# Patient Record
Sex: FEMALE | Race: WHITE | NOT HISPANIC OR LATINO | Employment: PART TIME | URBAN - METROPOLITAN AREA
[De-identification: names, ages, dates, MRNs, and addresses within clinical notes are randomized per-mention and may not be internally consistent; named-entity substitution may affect disease eponyms.]

---

## 2017-11-05 ENCOUNTER — APPOINTMENT (OUTPATIENT)
Dept: RADIOLOGY | Facility: CLINIC | Age: 27
End: 2017-11-05

## 2017-11-05 ENCOUNTER — OFFICE VISIT (OUTPATIENT)
Dept: URGENT CARE | Facility: CLINIC | Age: 27
End: 2017-11-05

## 2017-11-05 DIAGNOSIS — S99.921A INJURY OF RIGHT FOOT: ICD-10-CM

## 2017-11-05 PROCEDURE — 99204 OFFICE O/P NEW MOD 45 MIN: CPT

## 2017-11-05 PROCEDURE — 73630 X-RAY EXAM OF FOOT: CPT

## 2017-11-06 NOTE — PROGRESS NOTES
Assessment  1  Contusion of right foot, initial encounter (379 20) (S90 31XA)    Plan  Injury of right foot, initial encounter    · * XR FOOT 3+ VIEW RIGHT; Status:Resulted - Requires Verification,Retrospective By  Protocol Authorization;   Done: [id number] 11:57AM    Discussion/Summary  Discussion Summary:   X-ray review showed no fracture  Patient was instructed to take 200 milligram ibuprofen 3 times a day and soak in warm water  She will take Colace is out of her shoes as this aggravated her pain that she has been dealing with for the last 3 days  Her mother was present with her and understood the recommendations  Counseling Documentation With Imm: The patient, patient's family was counseled regarding diagnostic results,-- instructions for management  Follow Up Instructions: Follow Up with your Primary Care Provider in 2-3 days  If your symptoms worsen, go to the nearest [facility] Emergency Department  Chief Complaint  1  Foot Problem  Chief Complaint Free Text Note Form: dropped a tv on right foot 3-4 days ago still having pain and foot is ecchymotic at present time Pedal pulse noted to foot  History of Present Illness  HPI: Back is very pleasant 24-year-old female who was present with her mother states she had draft than 0 fashion television on the dorsum of her right foot 3 4 days ago  She wishes an x-ray due to the fact that the ecchymosis and swelling have gone down however ecchymosis has traveled into the skin  She has been taking ibuprofen 200 milligrams occasionally without relief  She has been using shoes that have Adonna Melita is rather tight and this has been giving her problem  Hospital Based Practices Required Assessment:   Pain Assessment   the patient states they have pain  The pain is located in the Dorsum of right foot   The patient describes the pain as dull and aching  (on a scale of 0 to 10, the patient rates the pain at 10 )   Abuse And Domestic Violence Screen    Yes, the patient is safe at home  -- The patient states no one is hurting them  Depression And Suicide Screen  No, the patient has not had thoughts of hurting themself  No, the patient has not felt depressed in the past 7 days  Prefered Language is  [place]  Primary Language is  English  Readiness To Learn: Receptive  Barriers To Learning: none  Education Completed: disease/condition   Teaching Method: verbal   Person Taught: patient   Evaluation Of Learning: verbalized/demonstrated understanding      Review of Systems  Focused-Female:   ENT: no ear ache, no loss of hearing, no nosebleeds or nasal discharge, no sore throat or hoarseness  Cardiovascular: no complaints of slow or fast heart rate, no chest pain, no palpitations, no leg claudication or lower extremity edema  Respiratory: no complaints of shortness of breath, no wheezing, no dyspnea on exertion, no orthopnea or PND  Gastrointestinal: no complaints of abdominal pain, no constipation, no nausea or diarrhea, no vomiting, no bloody stools  Musculoskeletal: as noted in HPI  Integumentary: as noted in HPI  ROS Reviewed:   ROS reviewed  Active Problems  1  Injury of right foot, initial encounter (749 8) (I37 396J)    Past Medical History  Active Problems And Past Medical History Reviewed: The active problems and past medical history were reviewed and updated today  Family History  Family History Reviewed: The family history was reviewed and updated today  Social History   · Never smoker   · No illicit drug use   · Social alcohol use (Z78 9)    Surgical History  Surgical History Reviewed: The surgical history was reviewed and updated today  Current Meds   1  No Reported Medications Recorded    Allergies  1   Penicillins    Vitals  Signs   Recorded: [id number] 11:50AM   Temperature: 97 8 F  Heart Rate: 82  Respiration: 18  Systolic: 525, LUE, Sitting  Diastolic: 66, LUE, Sitting  BP Cuff Size: Large  Height: 6 ft 1 in  Weight: 274 lb 7 54 oz  BMI Calculated: 36 21  BSA Calculated: 2 46  O2 Saturation: 100  Pain Scale: 1    Physical Exam    Constitutional   General appearance: No acute distress, well appearing and well nourished  Eyes   Conjunctiva and lids: No swelling, erythema or discharge  Pupils and irises: Equal, round and reactive to light  Ears, Nose, Mouth, and Throat   External inspection of ears and nose: Normal     Otoscopic examination: Tympanic membranes translucent with normal light reflex  Canals patent without erythema  Nasal mucosa, septum, and turbinates: Normal without edema or erythema  Pulmonary   Auscultation of lungs: Clear to auscultation  Cardiovascular   Auscultation of heart: Normal rate and rhythm, normal S1 and S2, without murmurs  Skin   Skin and subcutaneous tissue: Abnormal  -- ecchymosis noted at the dorsum of the foot with tenderness to palpation and ecchymosis which has traveled to the right great toe and to some of the plantar aspect  Range of motion and neurovascular intact at this time  Psychiatric   Orientation to person, place, and time: Normal     Mood and affect: Normal        Results/Data  * XR FOOT 3+ VIEW RIGHT [id number] 11:57AM [facility] Order Number: [id number]   Performing Comments: room 2     Test Name Result Flag Reference   XR FOOT 3+ VW RIGHT (Report)     RIGHT FOOT     INDICATION: Right foot pain  Injury  COMPARISON: None     VIEWS: 3     IMAGES: 3     FINDINGS:     There is no acute fracture or dislocation  No degenerative changes  No lytic or blastic lesions are seen  Soft tissues are unremarkable  IMPRESSION:     No acute osseous abnormality         Workstation performed: [id number]     Signed by:   Markus Vizcaino MD   11/5/17                               Signatures   Electronically signed by : Josemanuel Bal DO; Nov 5 2017  6:13PM EST                       (Author)

## 2017-12-05 ENCOUNTER — OFFICE VISIT (OUTPATIENT)
Dept: URGENT CARE | Facility: CLINIC | Age: 27
End: 2017-12-05
Payer: COMMERCIAL

## 2017-12-05 PROCEDURE — 86580 TB INTRADERMAL TEST: CPT

## 2017-12-07 NOTE — PROGRESS NOTES
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  Denies any medical issues  Review of Systems  Focused-Female:  Constitutional: No fever, no chills, feels well, no tiredness, no recent weight gain or loss  ENT: no ear ache, no loss of hearing, no nosebleeds or nasal discharge, no sore throat or hoarseness  Cardiovascular: no complaints of slow or fast heart rate, no chest pain, no palpitations, no leg claudication or lower extremity edema  Respiratory: no complaints of shortness of breath, no wheezing, no dyspnea on exertion, no orthopnea or PND  Breasts: no complaints of breast pain, breast lump or nipple discharge  Gastrointestinal: no complaints of abdominal pain, no constipation, no nausea or diarrhea, no vomiting, no bloody stools  Genitourinary: no complaints of dysuria, no incontinence, no pelvic pain, no dysmenorrhea, no vaginal discharge or abnormal vaginal bleeding  Musculoskeletal: no complaints of arthralgia, no myalgia, no joint swelling or stiffness, no limb pain or swelling  Integumentary: no complaints of skin rash or lesion, no itching or dry skin, no skin wounds  Neurological: no complaints of headache, no confusion, no numbness or tingling, no dizziness or fainting  ROS Reviewed:   ROS reviewed  Active Problems  1  Contusion of right foot, initial encounter (054 20) (S90 31XA)   2   Injury of right foot, initial encounter (959 7) (O43 827Y)    Past Medical History  Active Problems And Past Medical History Reviewed: The active problems and past medical history were reviewed and updated today  Family History  Family History Reviewed: The family history was reviewed and updated today  Social History   · Never smoker   · No illicit drug use   · Social alcohol use (Z78 9)  Social History Reviewed: The social history was reviewed and updated today  The social history was reviewed and is unchanged  Surgical History  Surgical History Reviewed: The surgical history was reviewed and updated today  Current Meds   1  No Reported Medications Recorded  Medication List Reviewed: The medication list was reviewed and updated today  Allergies    1  Penicillins    Vitals  Signs   Recorded: [id number] 04:01PM   Temperature: 98 1 F  Heart Rate: 84  Respiration: 18  Blood Pressure: 126 mm Hg  Blood Pressure: 67 mm Hg  Height: 6 ft 1 in  Weight: 279 lb   BMI Calculated: 36 81 kg/m2  BSA Calculated: 2 48 m2  O2 Saturation: 100    Physical Exam   Constitutional  General appearance: No acute distress, well appearing and well nourished  Eyes  Conjunctiva and lids: No swelling, erythema or discharge  Pupils and irises: Equal, round and reactive to light  Ears, Nose, Mouth, and Throat  External inspection of ears and nose: Normal    Otoscopic examination: Tympanic membranes translucent with normal light reflex  Canals patent without erythema  Nasal mucosa, septum, and turbinates: Normal without edema or erythema  Oropharynx: Normal with no erythema, edema, exudate or lesions  Pulmonary  Respiratory effort: No increased work of breathing or signs of respiratory distress  Auscultation of lungs: Clear to auscultation  Cardiovascular  Palpation of heart: Normal PMI, no thrills  Auscultation of heart: Normal rate and rhythm, normal S1 and S2, without murmurs     Examination of extremities for edema and/or varicosities: Normal    Abdomen  Abdomen: Non-tender, no masses  Liver and spleen: No hepatomegaly or splenomegaly  Lymphatic  Palpation of lymph nodes in neck: No lymphadenopathy  Musculoskeletal  Gait and station: Normal    Digits and nails: Normal without clubbing or cyanosis  Inspection/palpation of joints, bones, and muscles: Normal    Skin  Skin and subcutaneous tissue: Normal without rashes or lesions  Neurologic  Cranial nerves: Cranial nerves 2-12 intact  Reflexes: 2+ and symmetric  Sensation: No sensory loss     Psychiatric  Orientation to person, place, and time: Normal    Mood and affect: Normal        Signatures   Electronically signed by : Pretty Dang; Dec  5 2017  4:08PM EST                       (Author)    Electronically signed by : VALDEZ Saldivar ; Dec  6 2017  3:26PM EST                       (Co-author)

## 2018-01-18 NOTE — RESULT NOTES
Verified Results  * XR FOOT 3+ VIEW RIGHT [id number] 11:57AM Erick Winchester Order Number: [id number]   Performing Comments: room 2     Test Name Result Flag Reference   XR FOOT 3+ VW RIGHT (Report)     RIGHT FOOT     INDICATION: Right foot pain  Injury  COMPARISON: None     VIEWS: 3     IMAGES: 3     FINDINGS:     There is no acute fracture or dislocation  No degenerative changes  No lytic or blastic lesions are seen  Soft tissues are unremarkable  IMPRESSION:     No acute osseous abnormality         Workstation performed: [id number]     Signed by:   Sagar Churchill MD   11/5/17       Plan  Injury of right foot, initial encounter    · * XR FOOT 3+ VIEW RIGHT; Status:Complete;   Done: [id number] 11:57AM

## 2018-01-23 VITALS
DIASTOLIC BLOOD PRESSURE: 67 MMHG | HEIGHT: 72 IN | OXYGEN SATURATION: 100 % | SYSTOLIC BLOOD PRESSURE: 126 MMHG | RESPIRATION RATE: 18 BRPM | HEART RATE: 84 BPM | WEIGHT: 279 LBS | BODY MASS INDEX: 37.79 KG/M2 | TEMPERATURE: 98.1 F

## 2021-11-24 ENCOUNTER — EMERGENCY (EMERGENCY)
Facility: HOSPITAL | Age: 31
LOS: 0 days | Discharge: HOME | End: 2021-11-24
Attending: EMERGENCY MEDICINE | Admitting: EMERGENCY MEDICINE
Payer: COMMERCIAL

## 2021-11-24 VITALS
RESPIRATION RATE: 18 BRPM | DIASTOLIC BLOOD PRESSURE: 99 MMHG | WEIGHT: 293 LBS | HEART RATE: 95 BPM | SYSTOLIC BLOOD PRESSURE: 135 MMHG | OXYGEN SATURATION: 99 % | HEIGHT: 72 IN | TEMPERATURE: 98 F

## 2021-11-24 DIAGNOSIS — L08.9 LOCAL INFECTION OF THE SKIN AND SUBCUTANEOUS TISSUE, UNSPECIFIED: ICD-10-CM

## 2021-11-24 DIAGNOSIS — L53.9 ERYTHEMATOUS CONDITION, UNSPECIFIED: ICD-10-CM

## 2021-11-24 DIAGNOSIS — L03.115 CELLULITIS OF RIGHT LOWER LIMB: ICD-10-CM

## 2021-11-24 PROCEDURE — 99283 EMERGENCY DEPT VISIT LOW MDM: CPT

## 2021-11-24 RX ORDER — AZTREONAM 2 G
1 VIAL (EA) INJECTION
Qty: 20 | Refills: 0
Start: 2021-11-24 | End: 2021-12-03

## 2021-11-24 NOTE — ED PROVIDER NOTE - PATIENT PORTAL LINK FT
You can access the FollowMyHealth Patient Portal offered by [facility] by registering at the following website: [email or web address]. By joining [employer]’s FollowMyHealth portal, you will also be able to view your health information using other applications (apps) compatible with our system.

## 2021-11-24 NOTE — ED ADULT TRIAGE NOTE - CHIEF COMPLAINT QUOTE
Patient coming in for right thigh tattoo infection, got tattoo on Sunday and is itchy and burning and red since Monday. No fevers at home.

## 2021-11-24 NOTE — ED PROVIDER NOTE - ATTENDING CONTRIBUTION TO CARE
31 yo F no pmh presents with infected tattoo to her right leg. States that had the tattoo done on sunday without any issue. States that since she noted worsening redness to the area and a small amount of drainage today. no fevers or chills. Went to urgent care and was advised to come to the ED.     CONSTITUTIONAL: Well-developed; well-nourished; in no acute distress.   SKIN: warm, dry. + cellulitis to anterior and lateral right thigh.   HEAD: Normocephalic; atraumatic.  EYES: PERRL, EOMI, no conjunctival erythema  EXT: Normal ROM.  5/5 strength in all 4 extremities   NEURO: Alert, oriented, grossly unremarkable. neurovascularly intact

## 2021-11-24 NOTE — ED PROVIDER NOTE - OBJECTIVE STATEMENT
31 y/o female no Pmhx presents to the ED c/o "I got a tattoo on Sunday to my right thigh. It has gotten red, swollen and I feel spasm in my thigh." no fever/ chills/ hx trauma

## 2021-11-24 NOTE — ED PROVIDER NOTE - CLINICAL SUMMARY MEDICAL DECISION MAKING FREE TEXT BOX
Patient presents with an infected right tatto few days after getting it. + erythema and warmth. no fevers. Discharged with abx and return precautions.

## 2021-11-24 NOTE — ED PROVIDER NOTE - NSFOLLOWUPINSTRUCTIONS_ED_ALL_ED_FT
Cellulitis, Adult     Cellulitis is a skin infection. The infected area is often warm, red, swollen, and sore. It occurs most often in the arms and lower legs. It is very important to get treated for this condition.  What are the causes?  This condition is caused by bacteria. The bacteria enter through a break in the skin, such as a cut, burn, insect bite, open sore, or crack.  What increases the risk?  This condition is more likely to occur in people who:  Have a weak body defense system (immune system). Have open cuts, burns, bites, or scrapes on the skin.Are older than 60 years of age.Have a blood sugar problem (diabetes). Have a long-lasting (chronic) liver disease (cirrhosis) or kidney disease.Are very overweight (obese).Have a skin problem, such as:  Itchy rash (eczema).Slow movement of blood in the veins (venous stasis).Fluid buildup below the skin (edema).Have been treated with high-energy rays (radiation).Use IV drugs. What are the signs or symptoms?  Symptoms of this condition include:  Skin that is:  Red.Streaking.Spotting.Swollen.Sore or painful when you touch it.Warm.A fever.Chills. Blisters.How is this diagnosed?  This condition is diagnosed based on:  Medical history.Physical exam.Blood tests.Imaging tests.How is this treated?  Treatment for this condition may include:  Medicines to treat infections or allergies.Home care, such as:  Rest.Placing cold or warm cloths (compresses) on the skin.Hospital care, if the condition is very bad.Follow these instructions at home:  Medicines     Take over-the-counter and prescription medicines only as told by your doctor.If you were prescribed an antibiotic medicine, take it as told by your doctor. Do not stop taking it even if you start to feel better.General instructions        Drink enough fluid to keep your pee (urine) pale yellow.Do not touch or rub the infected area.Raise (elevate) the infected area above the level of your heart while you are sitting or lying down.Place cold or warm cloths on the area as told by your doctor.Keep all follow-up visits as told by your doctor. This is important.Contact a doctor if:  You have a fever.You do not start to get better after 1–2 days of treatment.Your bone or joint under the infected area starts to hurt after the skin has healed.Your infection comes back. This can happen in the same area or another area.You have a swollen bump in the area.You have new symptoms.You feel ill and have muscle aches and pains.Get help right away if:  Your symptoms get worse.You feel very sleepy.You throw up (vomit) or have watery poop (diarrhea) for a long time.You see red streaks coming from the area.Your red area gets larger.Your red area turns dark in color.These symptoms may represent a serious problem that is an emergency. Do not wait to see if the symptoms will go away. Get medical help right away. Call your local emergency services (911 in the [place] Do not drive yourself to the hospital.   Summary  Cellulitis is a skin infection. The area is often warm, red, swollen, and sore.This condition is treated with medicines, rest, and cold and warm cloths.Take all medicines only as told by your doctor.Tell your doctor if symptoms do not start to get better after 1–2 days of treatment.This information is not intended to replace advice given to you by your health care provider. Make sure you discuss any questions you have with your health care provider.    Document Released: 06/05/2009 Document Revised: 05/09/2019 Document Reviewed: 05/09/2019  [employer] Patient Education © 2019 [employer]

## 2023-04-25 PROBLEM — Z78.9 OTHER SPECIFIED HEALTH STATUS: Chronic | Status: ACTIVE | Noted: 2021-11-24

## 2023-05-09 PROBLEM — Z00.00 ENCOUNTER FOR PREVENTIVE HEALTH EXAMINATION: Status: ACTIVE | Noted: 2023-05-09

## 2023-05-10 ENCOUNTER — APPOINTMENT (OUTPATIENT)
Dept: PEDIATRIC ALLERGY IMMUNOLOGY | Facility: CLINIC | Age: 33
End: 2023-05-10
Payer: COMMERCIAL

## 2023-05-10 VITALS
SYSTOLIC BLOOD PRESSURE: 120 MMHG | BODY MASS INDEX: 31.29 KG/M2 | WEIGHT: 231 LBS | DIASTOLIC BLOOD PRESSURE: 80 MMHG | HEIGHT: 72 IN

## 2023-05-10 DIAGNOSIS — J30.89 OTHER ALLERGIC RHINITIS: ICD-10-CM

## 2023-05-10 DIAGNOSIS — J30.2 OTHER ALLERGIC RHINITIS: ICD-10-CM

## 2023-05-10 PROCEDURE — 99203 OFFICE O/P NEW LOW 30 MIN: CPT

## 2023-05-10 NOTE — ASSESSMENT
[FreeTextEntry1] : The patient is a 32 year old female with a history strongly suggestive of allergic rhinitis. In view of her recent antihistamine use I have ordered screening [employer] for various environmental allergens. I have also suggested she obtain a copy of her previous allergy testing. I will assess in two weeks.

## 2023-05-10 NOTE — PHYSICAL EXAM
[Alert] : alert [Well Nourished] : well nourished [Healthy Appearance] : healthy appearance [No Acute Distress] : no acute distress [Well Developed] : well developed [Normal Pupil & Iris Size/Symmetry] : normal pupil and iris size and symmetry [No Discharge] : no discharge [No Photophobia] : no photophobia [Sclera Not Icteric] : sclera not icteric [Normal Lips/Tongue] : the lips and tongue were normal [Normal Outer Ear/Nose] : the ears and nose were normal in appearance [Normal Tonsils] : normal tonsils [No Thrush] : no thrush [Pale mucosa] : pale mucosa [Boggy Nasal Turbinates] : boggy and/or pale nasal turbinates [Clear Rhinorrhea] : clear rhinorrhea was seen [Supple] : the neck was supple [Normal Rate and Effort] : normal respiratory rhythm and effort [No Crackles] : no crackles [No Retractions] : no retractions [Bilateral Audible Breath Sounds] : bilateral audible breath sounds [Normal Rate] : heart rate was normal  [Normal S1, S2] : normal S1 and S2 [No murmur] : no murmur [Regular Rhythm] : with a regular rhythm [Soft] : abdomen soft [Not Tender] : non-tender [Not Distended] : not distended [No HSM] : no hepato-splenomegaly [Normal Cervical Lymph Nodes] : cervical [Skin Intact] : skin intact  [No Rash] : no rash [No Skin Lesions] : no skin lesions [No clubbing] : no clubbing [No Edema] : no edema [No Cyanosis] : no cyanosis [Normal Mood] : mood was normal [Normal Affect] : affect was normal [Alert, Awake, Oriented as Age-Appropriate] : alert, awake, oriented as age appropriate [de-identified] : dull tympanic membranes

## 2023-05-10 NOTE — REVIEW OF SYSTEMS
[Rhinorrhea] : rhinorrhea [Nasal Dryness] : dryness of the nose [Nasal Congestion] : nasal congestion [Nasal Itching] : nasal itching [Sneezing] : sneezing [SOB with Exertion] : dyspnea on exertion [Cough] : cough [Wheezing] : wheezing [Nl] : Genitourinary

## 2023-05-10 NOTE — HISTORY OF PRESENT ILLNESS
[None] : The patient is currently asymptomatic [de-identified] : NUSRAT CHAU is a 32 year  old female. She moved to [place] in winter on 2020 and developed  seasonal nasal allergy symptoms, itchy eyes, itchy nose, itchy throat, congestion, runny nose and sneezing. She managed through that spring, summer and fall with symptoms and did well in the winter. In [place] on 2022 she acquired a dog and her symptoms got significantly worse and continued on year round only worse in the spring and fall. This past spring she has developed wheezing and shortness of breath in addition to her nasal symptoms. She has had various course of antibiotics over the last two years. She has tried various OTC antihistamines with minimal relief. She is here because her symptoms are now effecting her work and quality of life. SHe is here for evaluation and possible treatment.  \par

## 2023-06-02 ENCOUNTER — APPOINTMENT (OUTPATIENT)
Dept: PEDIATRIC ALLERGY IMMUNOLOGY | Facility: CLINIC | Age: 33
End: 2023-06-02